# Patient Record
Sex: MALE | Race: WHITE | HISPANIC OR LATINO | Employment: FULL TIME | URBAN - METROPOLITAN AREA
[De-identification: names, ages, dates, MRNs, and addresses within clinical notes are randomized per-mention and may not be internally consistent; named-entity substitution may affect disease eponyms.]

---

## 2017-08-14 ENCOUNTER — NON-PROVIDER VISIT (OUTPATIENT)
Dept: OCCUPATIONAL MEDICINE | Facility: CLINIC | Age: 27
End: 2017-08-14

## 2017-08-14 ENCOUNTER — OFFICE VISIT (OUTPATIENT)
Dept: OCCUPATIONAL MEDICINE | Facility: CLINIC | Age: 27
End: 2017-08-14

## 2017-08-14 DIAGNOSIS — Z00.00 PHYSICAL EXAM: ICD-10-CM

## 2017-08-14 DIAGNOSIS — Z02.1 PRE-EMPLOYMENT HEALTH SCREENING EXAMINATION: ICD-10-CM

## 2017-08-14 PROCEDURE — 8907 PR URINE COLLECT ONLY: Performed by: PREVENTIVE MEDICINE

## 2017-08-14 PROCEDURE — 7100 PR DOT PHYSICAL: Performed by: PREVENTIVE MEDICINE

## 2017-08-14 NOTE — MR AVS SNAPSHOT
Reggie Vazquez   2017 2:10 PM   Non-Provider Visit   MRN: [id number]    Department:  [facility]   Dept Phone:  [phone]    Description:  Male : 1990   Provider:  [facility] FERNANDO LEE R.N.           Reason for Visit     Other D/s      Allergies as of 2017     Not on File      You were diagnosed with     Pre-employment health screening examination   [246693]         Basic Information     Date Of Birth Sex Race Ethnicity Preferred Language    1990 Male Unable to Obtain  Origin (Dutch,Argentine,Eritrean,Kazakh, etc) English      Health Maintenance     Patient has no pending health maintenance at this time      Current Immunizations     No immunizations on file.      Below and/or attached are the medications your provider expects you to take. Review all of your home medications and newly ordered medications with your provider and/or pharmacist. Follow medication instructions as directed by your provider and/or pharmacist. Please keep your medication list with you and share with your provider. Update the information when medications are discontinued, doses are changed, or new medications (including over-the-counter products) are added; and carry medication information at all times in the event of emergency situations     Allergies:  (Not on file)          Medications  Valid as of: 2017 -  2:46 PM    Generic Name Brand Name Tablet Size Instructions for use    .                 Medicines prescribed today were sent to:     None      Medication refill instructions:       If your prescription bottle indicates you have medication refills left, it is not necessary to call your provider’s office. Please contact your pharmacy and they will refill your medication.    If your prescription bottle indicates you do not have any refills left, you may request refills at any time through one of the following ways: The online [employer] system (except Urgent Care), by calling  your provider’s office, or by asking your pharmacy to contact your provider’s office with a refill request. Medication refills are processed only during regular business hours and may not be available until the next business day. Your provider may request additional information or to have a follow-up visit with you prior to refilling your medication.   *Please Note: Medication refills are assigned a new Rx number when refilled electronically. Your pharmacy may indicate that no refills were authorized even though a new prescription for the same medication is available at the pharmacy. Please request the medicine by name with the pharmacy before contacting your provider for a refill.           Kochzauber Access Code: [email or web address]  Expires: 9/13/2017  2:46 PM    Your email address is not on file at [facility].  Email Addresses are required for you to sign up for Kochzauber, please contact [phone] to verify your personal information and to provide your email address prior to attempting to register for Kochzauber.    Reggie Vazquez  [phone] [phone]    Kochzauber  A secure, online tool to manage your health information     [facility]’s Kochzauber® is a secure, online tool that connects you to your personalized health information from the privacy of your home -- day or night - making it very easy for you to manage your healthcare. Once the activation process is completed, you can even access your medical information using the Kochzauber joselin, which is available for free in the Apple Joselin store or Google Play store.     To learn more about Kochzauber, visit [email or web address]    There are two levels of access available (as shown below):   My Chart Features  [facility]  Specialists [facility]  Urgent  Care [facility]   Email your healthcare team securely and privately 24/7 X X X    Manage appointments: schedule your next appointment; view details of past/upcoming  appointments X      Request prescription refills. X      View recent personal medical records, including lab and immunizations X X X X   View health record, including health history, allergies, medications X X X X   Read reports about your outpatient visits, procedures, consult and ER notes X X X X   See your discharge summary, which is a recap of your hospital and/or ER visit that includes your diagnosis, lab results, and care plan X X  X     How to register for [employer]:  Once your e-mail address has been verified, follow the following steps to sign up for [employer].     1. Go to  [email or web address]  2. Click on the Sign Up Now box, which takes you to the New Member Sign Up page. You will need to provide the following information:  a. Enter your [employer] Access Code exactly as it appears at the top of this page. (You will not need to use this code after you’ve completed the sign-up process. If you do not sign up before the expiration date, you must request a new code.)   b. Enter your date of birth.   c. Enter your home email address.   d. Click Submit, and follow the next screen’s instructions.  3. Create a [employer] ID. This will be your [employer] login ID and cannot be changed, so think of one that is secure and easy to remember.  4. Create a [employer] password. You can change your password at any time.  5. Enter your Password Reset Question and Answer. This can be used at a later time if you forget your password.   6. Enter your e-mail address. This allows you to receive e-mail notifications when new information is available in [employer].  7. Click Sign Up. You can now view your health information.    For assistance activating your [employer] account, call [phone]

## 2017-08-14 NOTE — MR AVS SNAPSHOT
Reggie Vazquez   2017 2:30 PM   Office Visit   MRN: [id number]    Department:  [facility]   Dept Phone:  [phone]    Description:  Male : 1990   Provider:  Conrado Miranda D.O.           Reason for Visit     Other DOT Physical PROCEDURE ROOM 1      Allergies as of 2017     Not on File      You were diagnosed with     Physical exam   [[id number]]         Basic Information     Date Of Birth Sex Race Ethnicity Preferred Language    1990 Male Unable to Obtain  Origin (Urdu,Croatian,Greenlandic,Togolese, etc) English      Health Maintenance     Patient has no pending health maintenance at this time      Current Immunizations     No immunizations on file.      Below and/or attached are the medications your provider expects you to take. Review all of your home medications and newly ordered medications with your provider and/or pharmacist. Follow medication instructions as directed by your provider and/or pharmacist. Please keep your medication list with you and share with your provider. Update the information when medications are discontinued, doses are changed, or new medications (including over-the-counter products) are added; and carry medication information at all times in the event of emergency situations     Allergies:  No Known Allergies          Medications  Valid as of: 2017 -  3:21 PM    Generic Name Brand Name Tablet Size Instructions for use    .                 Medicines prescribed today were sent to:     None      Medication refill instructions:       If your prescription bottle indicates you have medication refills left, it is not necessary to call your provider’s office. Please contact your pharmacy and they will refill your medication.    If your prescription bottle indicates you do not have any refills left, you may request refills at any time through one of the following ways: The online [employer] system (except Urgent Care), by calling your  provider’s office, or by asking your pharmacy to contact your provider’s office with a refill request. Medication refills are processed only during regular business hours and may not be available until the next business day. Your provider may request additional information or to have a follow-up visit with you prior to refilling your medication.   *Please Note: Medication refills are assigned a new Rx number when refilled electronically. Your pharmacy may indicate that no refills were authorized even though a new prescription for the same medication is available at the pharmacy. Please request the medicine by name with the pharmacy before contacting your provider for a refill.           Caviar Access Code: [email or web address]  Expires: 9/13/2017  2:46 PM    Your email address is not on file at [facility].  Email Addresses are required for you to sign up for [employer], please contact [phone] to verify your personal information and to provide your email address prior to attempting to register for [employer].    Reggie Vazquez  [address] [phone]    Caviar  A secure, online tool to manage your health information     [employer]’s Caviar® is a secure, online tool that connects you to your personalized health information from the privacy of your home -- day or night - making it very easy for you to manage your healthcare. Once the activation process is completed, you can even access your medical information using the Caviar joselin, which is available for free in the Apple Joselin store or Google Play store.     To learn more about [employer], visit [email or web address]    There are two levels of access available (as shown below):   My Chart Features  [facility]  Specialists [facility]  Urgent  Care [facility] Primary Care Doctor   Email your healthcare team securely and privately 24/7 X X X    Manage appointments: schedule your next appointment; view details of past/upcoming  appointments X      Request prescription refills. X      View recent personal medical records, including lab and immunizations X X X X   View health record, including health history, allergies, medications X X X X   Read reports about your outpatient visits, procedures, consult and ER notes X X X X   See your discharge summary, which is a recap of your hospital and/or ER visit that includes your diagnosis, lab results, and care plan X X  X     How to register for [employer]:  Once your e-mail address has been verified, follow the following steps to sign up for [employer].     1. Go to  [email or web address]  2. Click on the Sign Up Now box, which takes you to the New Member Sign Up page. You will need to provide the following information:  a. Enter your [employer]ou[employer] Access Code exactly as it appears at the top of this page. (You will not need to use this code after you’ve completed the sign-up process. If you do not sign up before the expiration date, you must request a new code.)   b. Enter your date of birth.   c. Enter your home email address.   d. Click Submit, and follow the next screen’s instructions.  3. Create a Zouxiu ID. This will be your Zouxiu login ID and cannot be changed, so think of one that is secure and easy to remember.  4. Create a Z[employer]u password. You can change your password at any time.  5. Enter your Password Reset Question and Answer. This can be used at a later time if you forget your password.   6. Enter your e-mail address. This allows you to receive e-mail notifications when new information is available in Zouxiu.  7. Click Sign Up. You can now view your health information.    For assistance activating your Z[employer] account, call [phone]

## 2019-11-27 ENCOUNTER — OFFICE VISIT (OUTPATIENT)
Dept: URGENT CARE | Facility: PHYSICIAN GROUP | Age: 29
End: 2019-11-27

## 2019-11-27 ENCOUNTER — NON-PROVIDER VISIT (OUTPATIENT)
Dept: URGENT CARE | Facility: PHYSICIAN GROUP | Age: 29
End: 2019-11-27

## 2019-11-27 VITALS
DIASTOLIC BLOOD PRESSURE: 72 MMHG | TEMPERATURE: 97.5 F | SYSTOLIC BLOOD PRESSURE: 104 MMHG | BODY MASS INDEX: 29.66 KG/M2 | WEIGHT: 178 LBS | RESPIRATION RATE: 14 BRPM | HEIGHT: 65 IN | OXYGEN SATURATION: 97 % | HEART RATE: 63 BPM

## 2019-11-27 DIAGNOSIS — Z02.4 ENCOUNTER FOR DEPARTMENT OF TRANSPORTATION (DOT) EXAMINATION FOR TRUCKING LICENCE: ICD-10-CM

## 2019-11-27 PROCEDURE — 8907 PR URINE COLLECT ONLY: Performed by: PHYSICIAN ASSISTANT

## 2019-11-27 PROCEDURE — 7100 PR DOT PHYSICAL: Performed by: NURSE PRACTITIONER

## 2019-11-27 NOTE — PROGRESS NOTES
"Subjective:      Reggie Vazquez is a 29 y.o. male who presents with Annual Exam (DOT)            HPI    ROS       Objective:     /72   Pulse 63   Temp 36.4 °C (97.5 °F)   Resp 14   Ht 1.651 m (5' 5\")   Wt 80.7 kg (178 lb)   SpO2 97%   BMI 29.62 kg/m²      Physical Exam       DOT physical     Assessment/Plan:       1. 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 Follow up as needed.    "

## 2020-07-08 ENCOUNTER — OFFICE VISIT (OUTPATIENT)
Dept: URGENT CARE | Facility: PHYSICIAN GROUP | Age: 30
End: 2020-07-08
Payer: COMMERCIAL

## 2020-07-08 ENCOUNTER — HOSPITAL ENCOUNTER (OUTPATIENT)
Facility: MEDICAL CENTER | Age: 30
End: 2020-07-08
Attending: PHYSICIAN ASSISTANT
Payer: COMMERCIAL

## 2020-07-08 VITALS
SYSTOLIC BLOOD PRESSURE: 108 MMHG | OXYGEN SATURATION: 95 % | DIASTOLIC BLOOD PRESSURE: 70 MMHG | HEART RATE: 106 BPM | WEIGHT: 178 LBS | HEIGHT: 65 IN | TEMPERATURE: 97.3 F | BODY MASS INDEX: 29.66 KG/M2 | RESPIRATION RATE: 16 BRPM

## 2020-07-08 DIAGNOSIS — Z20.822 SUSPECTED COVID-19 VIRUS INFECTION: ICD-10-CM

## 2020-07-08 DIAGNOSIS — Z20.822 CLOSE EXPOSURE TO COVID-19 VIRUS: ICD-10-CM

## 2020-07-08 PROCEDURE — 99213 OFFICE O/P EST LOW 20 MIN: CPT | Mod: CS | Performed by: PHYSICIAN ASSISTANT

## 2020-07-08 PROCEDURE — U0003 INFECTIOUS AGENT DETECTION BY NUCLEIC ACID (DNA OR RNA); SEVERE ACUTE RESPIRATORY SYNDROME CORONAVIRUS 2 (SARS-COV-2) (CORONAVIRUS DISEASE [COVID-19]), AMPLIFIED PROBE TECHNIQUE, MAKING USE OF HIGH THROUGHPUT TECHNOLOGIES AS DESCRIBED BY CMS-2020-01-R: HCPCS

## 2020-07-08 ASSESSMENT — ENCOUNTER SYMPTOMS
MYALGIAS: 1
FEVER: 1
VOMITING: 1
FOCAL WEAKNESS: 0
CHILLS: 1
SORE THROAT: 1
SHORTNESS OF BREATH: 0
SPUTUM PRODUCTION: 1
WHEEZING: 0
HEADACHES: 1
NAUSEA: 0
COUGH: 1
DIARRHEA: 0
SINUS PAIN: 0
ABDOMINAL PAIN: 0

## 2020-07-08 NOTE — PATIENT INSTRUCTIONS

## 2020-07-08 NOTE — LETTER
July 8, 2020         Patient: Reggie Vazquez   YOB: 1990   Date of Visit: 7/8/2020           To Whom it May Concern:    Reggie Vazquez was seen in my clinic on 7/8/2020. Please excuse from work for at least 10 days unless otherwise cleared by Medical professional.     If you have any questions or concerns, please don't hesitate to call.        Sincerely,           Eamon Kohli P.A.-C.  Electronically Signed

## 2020-07-08 NOTE — PROGRESS NOTES
"Subjective:   Reggie Vazquez is a 29 y.o. male who presents for Headache (Bodyaches, dizziness, cough since Monday.)      HPI    Onset of symptoms 2 days ago.   Sore throat, but this has resolved.   Vomit x 2 yesterday. After eating peanut butter and jelly. No blood.   Shakes, chills resolved.   Slight HA  Cough with slight pink tinge. \"Not too red\". Intermittent only if coughing hard and spitting. Occasionally gets blood nose. No recent bloody nose.     Feel \"hungover\". Feels similar to influenza.   No medications for symptoms.     Symptoms have improved today.     Denies any current fever, chills, chest pain, shortness of breath, wheezing, abdominal pain, nausea, diarrhea, loss of sense of taste or smell.     Brother in law test positive yesterday for COVID-19. Close exposure.     Temperature 98F 2 days ago.  Temperature 100.4F yesterday morning.   Temperature 99.5F this morning.       Review of Systems   Constitutional: Positive for chills, fever and malaise/fatigue.   HENT: Positive for sore throat. Negative for congestion and sinus pain.    Respiratory: Positive for cough and sputum production. Negative for shortness of breath and wheezing.    Cardiovascular: Negative for chest pain.   Gastrointestinal: Positive for vomiting. Negative for abdominal pain, diarrhea and nausea.   Musculoskeletal: Positive for myalgias.   Skin: Negative.    Neurological: Positive for headaches. Negative for focal weakness.          Medications:    • This patient does not have an active medication from one of the medication groupers.    Allergies: Patient has no known allergies.    Problem List: Reggie Vazquez does not have a problem list on file.    Surgical History:  No past surgical history on file.    Past Social Hx: Reggie Vazquez  reports that he has never smoked. He has never used smokeless tobacco.     Past Family Hx:  Reggie Vazquez family history is not on file.     Problem list, medications, and allergies reviewed by " "myself today in Epic.     Objective:     /70   Pulse (!) 106   Temp 36.3 °C (97.3 °F) (Temporal)   Resp 16   Ht 1.651 m (5' 5\")   Wt 80.7 kg (178 lb)   SpO2 95%   BMI 29.62 kg/m²     Physical Exam  Vitals signs reviewed.   Constitutional:       General: He is not in acute distress.     Appearance: Normal appearance. He is not ill-appearing or toxic-appearing.   HENT:      Mouth/Throat:      Lips: Pink.      Mouth: Mucous membranes are moist.      Tongue: No lesions.      Pharynx: Oropharynx is clear. Uvula midline. Posterior oropharyngeal erythema present. No pharyngeal swelling, oropharyngeal exudate or uvula swelling.      Tonsils: No tonsillar exudate.   Eyes:      Conjunctiva/sclera: Conjunctivae normal.      Pupils: Pupils are equal, round, and reactive to light.   Neck:      Musculoskeletal: Normal range of motion and neck supple.   Cardiovascular:      Rate and Rhythm: Normal rate and regular rhythm.      Heart sounds: Normal heart sounds.   Pulmonary:      Effort: Pulmonary effort is normal. No respiratory distress.      Breath sounds: Normal breath sounds. No wheezing, rhonchi or rales.   Lymphadenopathy:      Cervical: No cervical adenopathy.   Skin:     General: Skin is warm and dry.      Findings: No rash.   Neurological:      General: No focal deficit present.      Mental Status: He is alert and oriented to person, place, and time.   Psychiatric:         Mood and Affect: Mood normal.         Behavior: Behavior normal.         Assessment/Plan:     Diagnosis and associated orders:     1. Suspected COVID-19 virus infection  COVID/SARS COV-2 PCR   2. Close exposure to COVID-19 virus  COVID/SARS COV-2 PCR      Comments/MDM:     Discussed with patient signs and symptoms most likely are due to a viral etiology.   Test for COVID-19. We will call back for results and appropriate further instructions.   Symptomatic care.  Plenty of oral hydration and rest   Over the counter cough suppressant as " directed.  Tylenol or for pain and fever as directed.   Saline nasal spray or Mucinex as a decongestant.  Infection control measures at home. Stay away from people, Hand washing, covering sneeze/cough, disinfect surfaces.   Remain home from work, school, and other populated environments. Work note provided.   Discharge Instructions on Self Isolation handed to patient.   Overall, the patient is well-appearing. Normal vital signs and benign examination. Normal lung examination. Suspicions for pneumonia are low. Therefore, antibiotics are not indicated at this time.        I confirmed the patient's mobile phone number, that their voicemail was set up, and received verbal consent to leave a voicemail if they do not answer the call.  The patient was amenable with this means of communication.    Red flags discussed and indications to immediately call 911 or present to the Emergency Department.   Supportive care, differential diagnoses, and indications for immediate follow-up discussed with patient.    Pathogenesis of diagnosis discussed including typical length and natural progression. Patient expresses understanding and agrees to plan.    Advised the patient to follow-up with the primary care physician for recheck, reevaluation, and consideration of further management.    Please note that this dictation was created using voice recognition software. I have made a reasonable attempt to correct obvious errors, but I expect that there are errors of grammar and possibly content that I did not discover before finalizing the note.    This note was electronically signed by Eamon Kohli PA-C

## 2020-07-09 ENCOUNTER — TELEPHONE (OUTPATIENT)
Dept: URGENT CARE | Facility: CLINIC | Age: 30
End: 2020-07-09

## 2020-07-09 DIAGNOSIS — U07.1 COVID-19 VIRUS DETECTED: ICD-10-CM

## 2020-07-09 LAB
COVID ORDER STATUS COVID19: NORMAL
SARS-COV-2 RNA RESP QL NAA+PROBE: DETECTED
SPECIMEN SOURCE: ABNORMAL

## 2020-07-09 NOTE — TELEPHONE ENCOUNTER
Spoke to the patient on the phone regarding positive COVID-19 result.    He states he feels 100% better.  He states his symptoms have completely resolved and feels like his normal self.    Instructed since he has positive distal quarantine for 10 days.     Any return of symptoms return to the urgent care.  If any severe shortness of breath or fevers or chest pain present to the emergency room    The patient verbalized understanding and agreement.  Patient had no further questions or concerns.  The patient appreciated the call.

## 2021-06-03 ENCOUNTER — HOSPITAL ENCOUNTER (OUTPATIENT)
Dept: RADIOLOGY | Facility: MEDICAL CENTER | Age: 31
End: 2021-06-03
Attending: NURSE PRACTITIONER

## 2021-06-03 ENCOUNTER — OFFICE VISIT (OUTPATIENT)
Dept: URGENT CARE | Facility: PHYSICIAN GROUP | Age: 31
End: 2021-06-03

## 2021-06-03 VITALS
HEART RATE: 67 BPM | SYSTOLIC BLOOD PRESSURE: 124 MMHG | TEMPERATURE: 98.1 F | OXYGEN SATURATION: 96 % | HEIGHT: 65 IN | BODY MASS INDEX: 29.99 KG/M2 | DIASTOLIC BLOOD PRESSURE: 60 MMHG | RESPIRATION RATE: 16 BRPM | WEIGHT: 180 LBS

## 2021-06-03 DIAGNOSIS — K43.9 VENTRAL HERNIA WITHOUT OBSTRUCTION OR GANGRENE: Primary | ICD-10-CM

## 2021-06-03 DIAGNOSIS — R10.33 PERIUMBILICAL ABDOMINAL PAIN: ICD-10-CM

## 2021-06-03 PROCEDURE — 76705 ECHO EXAM OF ABDOMEN: CPT

## 2021-06-03 PROCEDURE — 99214 OFFICE O/P EST MOD 30 MIN: CPT | Performed by: NURSE PRACTITIONER

## 2021-06-03 RX ORDER — DOXYCYCLINE HYCLATE 100 MG
100 TABLET ORAL DAILY
COMMUNITY
Start: 2021-03-18 | End: 2021-06-03

## 2021-06-03 ASSESSMENT — ENCOUNTER SYMPTOMS
ABDOMINAL PAIN: 1
CONSTIPATION: 0
VOMITING: 1
DIZZINESS: 0
DIARRHEA: 0
FEVER: 0
CHILLS: 0
BACK PAIN: 0
NAUSEA: 1

## 2021-06-03 NOTE — PROGRESS NOTES
"Reggie Vazquez is a 30 y.o. male who presents for Abdominal Pain (happened today sharp pain on top of belly button, dizziness, felt like passing out, )      HPI This is a new problem.    Reggie is a 30 y.o male pt with c/o  Sudden onset abd pain. Today while at work he was lifting heavy boxes when he started to feel a sharp pain on top of his belly button. Pain was very severe and intense. He had to stop what he was doing. He became sweaty and nauseated. He threw up once. He reports that the pain is now dull but present. Overall much better now but had to leave work today. He tried to drink fluids which did not help. No other aggravating or alleviating factors. He has never felt pain like this before.       Review of Systems   Constitutional: Negative for chills, fever and malaise/fatigue.   Gastrointestinal: Positive for abdominal pain, nausea and vomiting. Negative for constipation and diarrhea.   Genitourinary: Negative for dysuria and urgency.   Musculoskeletal: Negative for back pain.   Neurological: Negative for dizziness.       No Known Allergies  History reviewed. No pertinent past medical history.  History reviewed. No pertinent surgical history.  History reviewed. No pertinent family history.  Social History     Tobacco Use   • Smoking status: Never Smoker   • Smokeless tobacco: Never Used   Substance Use Topics   • Alcohol use: Never     There is no problem list on file for this patient.    No current outpatient medications on file prior to visit.     No current facility-administered medications on file prior to visit.          Objective:     /60 (BP Location: Right arm, Patient Position: Sitting, BP Cuff Size: Adult)   Pulse 67   Temp 36.7 °C (98.1 °F) (Temporal)   Resp 16   Ht 1.651 m (5' 5\")   Wt 81.6 kg (180 lb)   SpO2 96%   BMI 29.95 kg/m²     Physical Exam  Vitals and nursing note reviewed.   Constitutional:       General: He is not in acute distress.     Appearance: Normal appearance. " He is well-developed. He is not toxic-appearing or diaphoretic.   HENT:      Head: Normocephalic.   Cardiovascular:      Rate and Rhythm: Normal rate and regular rhythm.      Pulses: Normal pulses.   Pulmonary:      Effort: Pulmonary effort is normal.      Breath sounds: Normal breath sounds.   Abdominal:      General: There is no distension.      Palpations: Abdomen is soft. There is no mass.      Tenderness: There is abdominal tenderness. There is no right CVA tenderness, left CVA tenderness, guarding or rebound.      Hernia: No hernia is present.       Musculoskeletal:         General: Normal range of motion.      Thoracic back: Normal.      Lumbar back: Normal.   Skin:     General: Skin is warm.      Capillary Refill: Capillary refill takes less than 2 seconds.   Neurological:      Mental Status: He is alert and oriented to person, place, and time.      Deep Tendon Reflexes: Reflexes are normal and symmetric.   Psychiatric:         Mood and Affect: Mood normal.         Behavior: Behavior normal. Behavior is cooperative.         Thought Content: Thought content normal.         Assessment /Associated Orders:      1. Ventral hernia without obstruction or gangrene  REFERRAL TO GENERAL SURGERY   2. Periumbilical abdominal pain  US-HERNIA ABDOMEN         Medical Decision Making:    Pt is clinically stable at today's acute urgent care visit.  No acute distress noted. Appropriate for outpatient management at this time.   Acute problem today with uncertain prognosis.     OTC  analgesic of choice (acetaminophen or NSAID). Follow manufactures dosing and safety precautions.     Warm/ cold packs prn pain     No heavy lifting - nothing greater than 25 pounds.       Advised to follow-up with the primary care provider for recheck, reevaluation, and consideration of further management if necessary.   Discussed management options (risks,benefits, and alternatives to treatment). Expressed understanding and the treatment plan was  agreed upon. Questions were encouraged and answered       Return to urgent care prn if new or worsening sx or if there is no improvement in condition prn . Red flags discussed and indications to immediately call 911 or present to the Emergency Department.     I personally reviewed prior external notes and test results pertinent to today's visit.  I have independently reviewed and interpreted all diagnostics ordered during this urgent care acute visit.   Time spent evaluating this patient was at least 30 minutes and includes preparing for visit, counseling/education, exam and evaluation, obtaining history, independent interpretation, ordering lab/test/procedures,medication management and documentation.

## 2021-06-03 NOTE — LETTER
Lilli 3, 2021       Patient: Reggie Vazquez   YOB: 1990   Date of Visit: 6/3/2021         To Whom It May Concern:    In my medical opinion, I recommend that Reggie Vazquez remain out of work until 06/05/21              Sincerely,          NICK Victor.  Electronically Signed

## 2023-02-04 ENCOUNTER — APPOINTMENT (OUTPATIENT)
Dept: RADIOLOGY | Facility: MEDICAL CENTER | Age: 33
End: 2023-02-04
Payer: COMMERCIAL

## 2023-02-04 ENCOUNTER — APPOINTMENT (OUTPATIENT)
Dept: RADIOLOGY | Facility: MEDICAL CENTER | Age: 33
End: 2023-02-04
Attending: EMERGENCY MEDICINE
Payer: COMMERCIAL

## 2023-02-04 ENCOUNTER — HOSPITAL ENCOUNTER (EMERGENCY)
Facility: MEDICAL CENTER | Age: 33
End: 2023-02-04
Attending: EMERGENCY MEDICINE
Payer: COMMERCIAL

## 2023-02-04 VITALS
OXYGEN SATURATION: 95 % | HEART RATE: 66 BPM | HEIGHT: 65 IN | WEIGHT: 180 LBS | DIASTOLIC BLOOD PRESSURE: 67 MMHG | SYSTOLIC BLOOD PRESSURE: 123 MMHG | BODY MASS INDEX: 29.99 KG/M2 | RESPIRATION RATE: 15 BRPM | TEMPERATURE: 98.3 F

## 2023-02-04 DIAGNOSIS — S30.1XXA CONTUSION OF ABDOMINAL WALL, INITIAL ENCOUNTER: ICD-10-CM

## 2023-02-04 DIAGNOSIS — S93.492A SPRAIN OF ANTERIOR TALOFIBULAR LIGAMENT OF LEFT ANKLE, INITIAL ENCOUNTER: ICD-10-CM

## 2023-02-04 DIAGNOSIS — S83.92XA SPRAIN OF LEFT KNEE, UNSPECIFIED LIGAMENT, INITIAL ENCOUNTER: ICD-10-CM

## 2023-02-04 PROCEDURE — 99285 EMERGENCY DEPT VISIT HI MDM: CPT

## 2023-02-04 PROCEDURE — 73560 X-RAY EXAM OF KNEE 1 OR 2: CPT | Mod: LT

## 2023-02-04 PROCEDURE — 71045 X-RAY EXAM CHEST 1 VIEW: CPT

## 2023-02-04 PROCEDURE — 73706 CT ANGIO LWR EXTR W/O&W/DYE: CPT | Mod: LT

## 2023-02-04 PROCEDURE — 73600 X-RAY EXAM OF ANKLE: CPT | Mod: LT

## 2023-02-04 PROCEDURE — 307740 HCHG GREEN TRAUMA TEAM SERVICES

## 2023-02-04 PROCEDURE — 74177 CT ABD & PELVIS W/CONTRAST: CPT

## 2023-02-04 PROCEDURE — 700117 HCHG RX CONTRAST REV CODE 255: Performed by: EMERGENCY MEDICINE

## 2023-02-04 RX ADMIN — IOHEXOL 100 ML: 350 INJECTION, SOLUTION INTRAVENOUS at 18:00

## 2023-02-04 ASSESSMENT — PAIN SCALES - WONG BAKER: WONGBAKER_NUMERICALRESPONSE: HURTS A LITTLE MORE

## 2023-02-05 NOTE — DISCHARGE INSTRUCTIONS
Ankle Sprain    Ice the ankle 15 minutes at a time 4-6 times daily the first two days.  Elevate and rest the ankle as much as possible.  Use splint and/or crutches if helpful and while needed.  Take ibuprofen and Tylenol as needed for pain.  Followup if not much better in 7 days for repeat xrays.  Wear knee brace as long as helpful.  You may walk when pain allows.  Return to the ER for worsening abdominal pain, fever, shortness of breath or other unexpected symptoms.    Your caregiver has diagnosed you as suffering from an ankle sprain. Ankle sprain occurs when the ligaments that hold the ankle joint together are stretched or torn. It may take 4 to 6 weeks to heal.    HOME CARE INSTRUCTIONS  Apply ice to the sore area for 15 to 20 minutes four times per day while awake for the first 2 days. Put the ice in a plastic bag and place a towel between the bag of ice and your skin.  After 2 days, you may apply heat to the injury to help relieve pain. You may use a warm heating pad for 15 to 20 minutes four times per day while awake for 2 days or as needed. Do not sleep with a heating pad. If you are diabetic, do not use a heating pad unless instructed to do so.  Keep your leg elevated when possible to lessen swelling.  For Activity: Use crutches with non-weight bearing for 1 week. Then, you may walk on your ankle as the pain allows, or as instructed. Start gradually with weight bearing on the affected ankle.  Continue to use crutches or cane until you can stand on your ankle without causing pain.  If a plaster splint was applied, wear the splint until you are seen for a follow-up examination. Rest it on nothing harder than a pillow the first 24 hours. Do not put weight on it. Do not get it wet. You may take it off to take a shower or bath.   If an air splint was applied, you may blow more air in it or take some out to make it more comfortable. You may take it off at night and to take a shower or bath.  Wiggle your toes in  the splint several times per day if you are able.  You may have been given an elastic bandage to use with the plaster splint or alone. The splint is too tight if you have numbness, tingling, or if your foot becomes cold and blue. Adjust the bandage to make it comfortable.  Take medications as directed by your caregiver. Use acetaminophen (Tylenol®)or ibuprofen (Advil® or Motrin®) as needed for pain.    CALL IF:  You have an increase in bruising, swelling or pain.  You notice coldness of your toes.  Pain relief is not obtained with medications.    seek immediate medical attention if: your toes are numb or blue or you have severe pain.      [employer]® Patient Information ©2007 [employer].
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
## 2023-02-05 NOTE — ED NOTES
Provided pt with written and verbal instructions regarding follow-up and activity. All questions answered and patient verbalized understanding. Pt wheeled out of unit.
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
## 2023-02-05 NOTE — ED NOTES
Crutches and crutch education provided. Pt demonstrated proper crutch use and all questions answered.

## 2023-02-09 ENCOUNTER — HOSPITAL ENCOUNTER (OUTPATIENT)
Dept: RADIOLOGY | Facility: MEDICAL CENTER | Age: 33
End: 2023-02-09
Attending: NURSE PRACTITIONER
Payer: COMMERCIAL

## 2023-02-09 DIAGNOSIS — S93.492A SPRAIN OF OTHER LIGAMENT OF LEFT ANKLE, INITIAL ENCOUNTER: ICD-10-CM

## 2023-02-09 DIAGNOSIS — M79.672 LEFT FOOT PAIN: ICD-10-CM

## 2023-02-09 PROCEDURE — 73721 MRI JNT OF LWR EXTRE W/O DYE: CPT

## 2023-02-09 PROCEDURE — 73718 MRI LOWER EXTREMITY W/O DYE: CPT

## 2024-08-13 ENCOUNTER — OFFICE VISIT (OUTPATIENT)
Dept: URGENT CARE | Facility: PHYSICIAN GROUP | Age: 34
End: 2024-08-13
Payer: COMMERCIAL

## 2024-08-13 ENCOUNTER — HOSPITAL ENCOUNTER (OUTPATIENT)
Dept: RADIOLOGY | Facility: MEDICAL CENTER | Age: 34
End: 2024-08-13
Attending: STUDENT IN AN ORGANIZED HEALTH CARE EDUCATION/TRAINING PROGRAM
Payer: COMMERCIAL

## 2024-08-13 ENCOUNTER — HOSPITAL ENCOUNTER (OUTPATIENT)
Facility: MEDICAL CENTER | Age: 34
End: 2024-08-13
Attending: STUDENT IN AN ORGANIZED HEALTH CARE EDUCATION/TRAINING PROGRAM
Payer: COMMERCIAL

## 2024-08-13 VITALS
HEART RATE: 98 BPM | HEIGHT: 65 IN | WEIGHT: 201.6 LBS | BODY MASS INDEX: 33.59 KG/M2 | RESPIRATION RATE: 18 BRPM | TEMPERATURE: 98.1 F | OXYGEN SATURATION: 97 % | DIASTOLIC BLOOD PRESSURE: 62 MMHG | SYSTOLIC BLOOD PRESSURE: 118 MMHG

## 2024-08-13 DIAGNOSIS — K40.90 RIGHT INGUINAL HERNIA: ICD-10-CM

## 2024-08-13 DIAGNOSIS — N50.811 RIGHT TESTICULAR PAIN: ICD-10-CM

## 2024-08-13 DIAGNOSIS — R10.31 RIGHT INGUINAL PAIN: ICD-10-CM

## 2024-08-13 LAB
APPEARANCE UR: CLEAR
BILIRUB UR STRIP-MCNC: NEGATIVE MG/DL
COLOR UR AUTO: YELLOW
GLUCOSE UR STRIP.AUTO-MCNC: NEGATIVE MG/DL
KETONES UR STRIP.AUTO-MCNC: NEGATIVE MG/DL
LEUKOCYTE ESTERASE UR QL STRIP.AUTO: NEGATIVE
NITRITE UR QL STRIP.AUTO: NEGATIVE
PH UR STRIP.AUTO: 6 [PH] (ref 5–8)
PROT UR QL STRIP: NEGATIVE MG/DL
RBC UR QL AUTO: NEGATIVE
SP GR UR STRIP.AUTO: 1.03
UROBILINOGEN UR STRIP-MCNC: 1 MG/DL

## 2024-08-13 PROCEDURE — 76870 US EXAM SCROTUM: CPT

## 2024-08-13 PROCEDURE — 87591 N.GONORRHOEAE DNA AMP PROB: CPT

## 2024-08-13 PROCEDURE — 87491 CHLMYD TRACH DNA AMP PROBE: CPT

## 2024-08-13 PROCEDURE — 99214 OFFICE O/P EST MOD 30 MIN: CPT | Mod: 25 | Performed by: STUDENT IN AN ORGANIZED HEALTH CARE EDUCATION/TRAINING PROGRAM

## 2024-08-13 PROCEDURE — 81002 URINALYSIS NONAUTO W/O SCOPE: CPT | Performed by: STUDENT IN AN ORGANIZED HEALTH CARE EDUCATION/TRAINING PROGRAM

## 2024-08-13 RX ORDER — KETOROLAC TROMETHAMINE 15 MG/ML
15 INJECTION, SOLUTION INTRAMUSCULAR; INTRAVENOUS ONCE
Status: COMPLETED | OUTPATIENT
Start: 2024-08-13 | End: 2024-08-13

## 2024-08-13 RX ADMIN — KETOROLAC TROMETHAMINE 15 MG: 15 INJECTION, SOLUTION INTRAMUSCULAR; INTRAVENOUS at 08:58

## 2024-08-13 NOTE — LETTER
August 17, 2024       Patient: Reggie Vazquez   YOB: 1990   Date of Visit: 8/13/2024         To Whom It May Concern:    Reggie Vazquez needs to be on a 25lb weight limit until following up with general surgery for further guidance.     If you have any questions or concerns, please don't hesitate to call [phone]          Sincerely,          Shaji Vallejo D.O.  Electronically Signed

## 2024-08-13 NOTE — PROGRESS NOTES
"Subjective:   CHIEF COMPLAINT  Chief Complaint   Patient presents with    Other     Groin pain,possible right testicle,x4 days       HPI  Reggie Vazquez is a 33 y.o. male who presents with a chief complaint of right scrotal pain x 4 days.  Symptoms started while watching a movie and at home.  Says pain is constant and aggravated with laying down.  He has tried Tylenol and ibuprofen which only provide symptomatic relief for approximately 1 hour.  Reports on day 0 was experiencing some chills but no bodyaches or fevers.  No dysuria or hematuria.  No palpable bulges.  No history of similar symptoms.  No concerns for STIs.  Accompanied by his wife and daughter.    REVIEW OF SYSTEMS  General: no fever or chills  GI: no nausea or vomiting  See HPI for further details.    PAST MEDICAL HISTORY  There are no problems to display for this patient.      SURGICAL HISTORY  patient denies any surgical history    ALLERGIES  No Known Allergies    CURRENT MEDICATIONS  Home Medications       Reviewed by Shaji Vallejo D.O. (Physician) on 08/13/24 at 1754  Med List Status: <None>     Medication Last Dose Status   ciprofloxacin (CIPRO) 500 MG Tab Not Taking Active   doxycycline (VIBRAMYCIN) 100 MG Tab Not Taking Active   gabapentin (NEURONTIN) 100 MG Cap Not Taking Active   gabapentin (NEURONTIN) 100 MG Cap Not Taking Active                    SOCIAL HISTORY  Social History     Tobacco Use    Smoking status: Never    Smokeless tobacco: Never   Vaping Use    Vaping status: Never Used   Substance and Sexual Activity    Alcohol use: Never    Drug use: Never    Sexual activity: Not on file       FAMILY HISTORY  No family history on file.       Objective:   PHYSICAL EXAM  VITAL SIGNS: /62 (BP Location: Right arm, Patient Position: Sitting, BP Cuff Size: Adult)   Pulse 98   Temp 36.7 °C (98.1 °F) (Temporal)   Resp 18   Ht 1.651 m (5' 5\")   Wt 91.4 kg (201 lb 9.6 oz)   SpO2 97%   BMI 33.55 kg/m²     Gen: no acute " distress, normal voice  Skin: dry, intact, moist mucosal membranes  Eyes: No conjunctival injection b/l  Neck: Normal range of motion. No meningeal signs.   Lungs: No increased work of breathing.  CTAB w/ symmetric expansion  CV: RRR w/o murmurs or clicks  : Uncircumcised. No penile discharge. No gross skin lesions or vesicular rashes.  No scrotal erythema, edema.  No high-riding, transversely oriented testicle or presence of testicular masses bilaterally.  No testicular TTP.  Fluid-filled vesicle within the spermatic cord right side.  No palpable indirect hernia bilaterally.   Psych: normal affect, normal judgement, alert, awake      RADIOLOGY RESULTS   US-DUPLEX TESTICLE COMPLETE (COMBO)    Result Date: 8/13/2024 8/13/2024 4:09 PM HISTORY/REASON FOR EXAM: Pain; pain and fullness right scrotum. TECHNIQUE/EXAM DESCRIPTION: Real-time sonography of the scrotum was performed with gray-scale, color and duplex Doppler imaging. Arterial and venous duplex evaluation. COMPARISON: None FINDINGS: The right testis measures 4.9 x 2.2 x 3.2 CM. Normal in size and echotexture. Normal vascularity on color Doppler. No intratesticular mass. The left testis measures  5.0 x 2.3 x 3.3 CM. Normal in size and echotexture. Normal vascularity on color Doppler. No intratesticular mass. Vascular flow is symmetric. Appearance of the epididymides are within normal limits. No abnormal volume hydrocele is detected. No varicocele is detected.     1.  Normal scrotum ultrasound.             Assessment/Plan:     1. Right testicular pain  POCT Urinalysis    Chlamydia/GC, PCR (Urine)    US-DUPLEX TESTICLE COMPLETE (COMBO)    ketorolac (Toradol) 15 MG/ML injection 15 mg    US-INGUINAL HERNIA      2. Right inguinal pain        3. Right inguinal hernia  Referral to General Surgery      Toradol 15 mg IM x 1 given in clinic.  Scrotal ultrasound was ordered which was unremarkable.  I spoke with the patient over the phone informing him of the imaging  results.  States he is feeling much better after getting the shot of Toradol.      I ordered an inguinal ultrasound for further evaluation; provided the contact information to the patient and he was instructed to call tomorrow morning to get on the schedule soon as possible.  In the meantime recommended ibuprofen 800 mg every 8 hours as needed for pain.      GC/CT test was also ordered.    Addendum:  I spoke with the patient on August 17 reviewing the results of the ultrasound which demonstrated a right inguinal hernia.  Patient reports he has not been experiencing any further pain since receiving his shot of Toradol.    -I recommended continuation of ibuprofen 800 every 8 hours as needed if develop return of pain.   -If pain becomes refractory or develop nausea vomiting next step is the emergency room.  -Provided a note for work recommending a 25 pound weight limit  -Ordered referral to follow-up with general surgery.  Patient understood everything discussed today and all questions were answered        Please note that this dictation was created using voice recognition software. I have made a reasonable attempt to correct obvious errors, but I expect that there are errors of grammar and possibly content that I did not discover before finalizing the note.

## 2024-08-13 NOTE — LETTER
August 13, 2024       Patient: Reggie Vazquez   YOB: 1990   Date of Visit: 8/13/2024         To Whom It May Concern:    Reggie Vazquez is excused from work today.     If you have any questions or concerns, please don't hesitate to call [phone]          Sincerely,          Shaji Vallejo D.O.  Electronically Signed

## 2024-08-14 ENCOUNTER — HOSPITAL ENCOUNTER (OUTPATIENT)
Dept: RADIOLOGY | Facility: MEDICAL CENTER | Age: 34
End: 2024-08-14
Attending: STUDENT IN AN ORGANIZED HEALTH CARE EDUCATION/TRAINING PROGRAM
Payer: COMMERCIAL

## 2024-08-14 DIAGNOSIS — N50.811 RIGHT TESTICULAR PAIN: ICD-10-CM

## 2024-08-14 LAB
C TRACH DNA SPEC QL NAA+PROBE: NEGATIVE
N GONORRHOEA DNA SPEC QL NAA+PROBE: NEGATIVE
SPECIMEN SOURCE: NORMAL

## 2024-08-14 PROCEDURE — 76857 US EXAM PELVIC LIMITED: CPT

## 2024-08-16 ENCOUNTER — TELEPHONE (OUTPATIENT)
Dept: URGENT CARE | Facility: PHYSICIAN GROUP | Age: 34
End: 2024-08-16
Payer: COMMERCIAL

## 2024-08-17 NOTE — TELEPHONE ENCOUNTER
Patient Reggie called to see if he is able to get note to return to work on Monday 8/19/24 and he will  in office [place]ren morning 8/18/24

## 2024-10-30 ENCOUNTER — OFFICE VISIT (OUTPATIENT)
Dept: URGENT CARE | Facility: PHYSICIAN GROUP | Age: 34
End: 2024-10-30
Payer: COMMERCIAL

## 2024-10-30 VITALS
HEIGHT: 65 IN | BODY MASS INDEX: 33.49 KG/M2 | DIASTOLIC BLOOD PRESSURE: 70 MMHG | HEART RATE: 66 BPM | TEMPERATURE: 97.8 F | RESPIRATION RATE: 16 BRPM | SYSTOLIC BLOOD PRESSURE: 114 MMHG | WEIGHT: 201 LBS | OXYGEN SATURATION: 95 %

## 2024-10-30 DIAGNOSIS — J06.9 URI WITH COUGH AND CONGESTION: ICD-10-CM

## 2024-10-30 LAB
FLUAV RNA SPEC QL NAA+PROBE: NEGATIVE
FLUBV RNA SPEC QL NAA+PROBE: NEGATIVE
RSV RNA SPEC QL NAA+PROBE: NEGATIVE
S PYO DNA SPEC NAA+PROBE: NOT DETECTED
SARS-COV-2 RNA RESP QL NAA+PROBE: NEGATIVE

## 2024-10-30 RX ORDER — BENZONATATE 100 MG/1
200 CAPSULE ORAL 3 TIMES DAILY PRN
Qty: 60 CAPSULE | Refills: 0 | Status: SHIPPED | OUTPATIENT
Start: 2024-10-30

## 2024-10-30 RX ORDER — ALBUTEROL SULFATE 90 UG/1
2 INHALANT RESPIRATORY (INHALATION) EVERY 6 HOURS PRN
Qty: 8.5 G | Refills: 0 | Status: SHIPPED | OUTPATIENT
Start: 2024-10-30

## 2024-10-30 RX ORDER — DEXAMETHASONE SODIUM PHOSPHATE 10 MG/ML
10 INJECTION INTRAMUSCULAR; INTRAVENOUS ONCE
Status: COMPLETED | OUTPATIENT
Start: 2024-10-30 | End: 2024-10-30

## 2024-10-30 RX ADMIN — DEXAMETHASONE SODIUM PHOSPHATE 10 MG: 10 INJECTION INTRAMUSCULAR; INTRAVENOUS at 11:36

## 2024-10-30 ASSESSMENT — ENCOUNTER SYMPTOMS
CHILLS: 0
SHORTNESS OF BREATH: 1
DIARRHEA: 0
NAUSEA: 0
SORE THROAT: 1
FEVER: 0
MYALGIAS: 0
HEADACHES: 0
VOMITING: 0
ABDOMINAL PAIN: 0
COUGH: 1